# Patient Record
Sex: FEMALE | Race: WHITE | Employment: FULL TIME | URBAN - METROPOLITAN AREA
[De-identification: names, ages, dates, MRNs, and addresses within clinical notes are randomized per-mention and may not be internally consistent; named-entity substitution may affect disease eponyms.]

---

## 2021-03-26 ENCOUNTER — HOSPITAL ENCOUNTER (EMERGENCY)
Age: 26
Discharge: HOME | End: 2021-03-26
Attending: EMERGENCY MEDICINE
Payer: MEDICARE

## 2021-03-26 ENCOUNTER — APPOINTMENT (OUTPATIENT)
Dept: GENERAL RADIOLOGY | Age: 26
End: 2021-03-26
Payer: MEDICARE

## 2021-03-26 VITALS
WEIGHT: 180 LBS | TEMPERATURE: 98.8 F | RESPIRATION RATE: 16 BRPM | OXYGEN SATURATION: 97 % | HEIGHT: 66 IN | BODY MASS INDEX: 28.93 KG/M2 | DIASTOLIC BLOOD PRESSURE: 70 MMHG | SYSTOLIC BLOOD PRESSURE: 133 MMHG | HEART RATE: 94 BPM

## 2021-03-26 DIAGNOSIS — R07.9 CHEST PAIN, UNSPECIFIED TYPE: Primary | ICD-10-CM

## 2021-03-26 DIAGNOSIS — R06.00 DYSPNEA, UNSPECIFIED TYPE: ICD-10-CM

## 2021-03-26 LAB
ABSOLUTE EOS #: 0.08 K/UL (ref 0–0.44)
ABSOLUTE IMMATURE GRANULOCYTE: 0.02 K/UL (ref 0–0.3)
ABSOLUTE LYMPH #: 1.97 K/UL (ref 1.1–3.7)
ABSOLUTE MONO #: 0.36 K/UL (ref 0.1–1.2)
ANION GAP SERPL CALCULATED.3IONS-SCNC: 13 MMOL/L (ref 9–17)
BASOPHILS # BLD: 0 % (ref 0–2)
BASOPHILS ABSOLUTE: <0.03 K/UL (ref 0–0.2)
BUN BLDV-MCNC: 10 MG/DL (ref 6–20)
BUN/CREAT BLD: 14 (ref 9–20)
CALCIUM SERPL-MCNC: 9.5 MG/DL (ref 8.6–10.4)
CHLORIDE BLD-SCNC: 104 MMOL/L (ref 98–107)
CO2: 23 MMOL/L (ref 20–31)
CREAT SERPL-MCNC: 0.7 MG/DL (ref 0.5–0.9)
D-DIMER QUANTITATIVE: <0.27 MG/L FEU (ref 0–0.59)
DIFFERENTIAL TYPE: ABNORMAL
EOSINOPHILS RELATIVE PERCENT: 1 % (ref 1–4)
GFR AFRICAN AMERICAN: >60 ML/MIN
GFR NON-AFRICAN AMERICAN: >60 ML/MIN
GFR SERPL CREATININE-BSD FRML MDRD: ABNORMAL ML/MIN/{1.73_M2}
GFR SERPL CREATININE-BSD FRML MDRD: ABNORMAL ML/MIN/{1.73_M2}
GLUCOSE BLD-MCNC: 103 MG/DL (ref 70–99)
HCT VFR BLD CALC: 47.1 % (ref 36.3–47.1)
HEMOGLOBIN: 15.6 G/DL (ref 11.9–15.1)
IMMATURE GRANULOCYTES: 0 %
LYMPHOCYTES # BLD: 33 % (ref 24–43)
MCH RBC QN AUTO: 29.8 PG (ref 25.2–33.5)
MCHC RBC AUTO-ENTMCNC: 33.1 G/DL (ref 28.4–34.8)
MCV RBC AUTO: 90.1 FL (ref 82.6–102.9)
MONOCYTES # BLD: 6 % (ref 3–12)
MYOGLOBIN: 29 NG/ML (ref 25–58)
NRBC AUTOMATED: 0 PER 100 WBC
PDW BLD-RTO: 11.9 % (ref 11.8–14.4)
PLATELET # BLD: 319 K/UL (ref 138–453)
PLATELET ESTIMATE: ABNORMAL
PMV BLD AUTO: 9.6 FL (ref 8.1–13.5)
POTASSIUM SERPL-SCNC: 4.1 MMOL/L (ref 3.7–5.3)
RBC # BLD: 5.23 M/UL (ref 3.95–5.11)
RBC # BLD: ABNORMAL 10*6/UL
SEG NEUTROPHILS: 60 % (ref 36–65)
SEGMENTED NEUTROPHILS ABSOLUTE COUNT: 3.53 K/UL (ref 1.5–8.1)
SODIUM BLD-SCNC: 140 MMOL/L (ref 135–144)
TROPONIN INTERP: NORMAL
TROPONIN T: NORMAL NG/ML
TROPONIN, HIGH SENSITIVITY: <6 NG/L (ref 0–14)
WBC # BLD: 6 K/UL (ref 3.5–11.3)
WBC # BLD: ABNORMAL 10*3/UL

## 2021-03-26 PROCEDURE — 83874 ASSAY OF MYOGLOBIN: CPT

## 2021-03-26 PROCEDURE — 85379 FIBRIN DEGRADATION QUANT: CPT

## 2021-03-26 PROCEDURE — 85025 COMPLETE CBC W/AUTO DIFF WBC: CPT

## 2021-03-26 PROCEDURE — 99283 EMERGENCY DEPT VISIT LOW MDM: CPT

## 2021-03-26 PROCEDURE — 84484 ASSAY OF TROPONIN QUANT: CPT

## 2021-03-26 PROCEDURE — 80048 BASIC METABOLIC PNL TOTAL CA: CPT

## 2021-03-26 PROCEDURE — 71045 X-RAY EXAM CHEST 1 VIEW: CPT

## 2021-03-26 PROCEDURE — 93005 ELECTROCARDIOGRAM TRACING: CPT | Performed by: NURSE PRACTITIONER

## 2021-03-26 RX ORDER — LORATADINE 10 MG/1
10 TABLET ORAL DAILY
COMMUNITY
End: 2022-01-19

## 2021-03-26 RX ORDER — PREDNISONE 20 MG/1
20 TABLET ORAL DAILY
COMMUNITY
End: 2022-01-19

## 2021-03-26 ASSESSMENT — ENCOUNTER SYMPTOMS
SORE THROAT: 0
SINUS PRESSURE: 0
DIARRHEA: 0
SHORTNESS OF BREATH: 1
NAUSEA: 0
COUGH: 1
COLOR CHANGE: 0
RHINORRHEA: 0
VOMITING: 0
ABDOMINAL PAIN: 0

## 2021-03-26 NOTE — ED PROVIDER NOTES
[address] ED  eMERGENCY dEPARTMENT eNCOUnter      Pt Name: Louann Farley  MRN: [id number]  [place] 1995  Date of evaluation: 3/26/2021  Provider: SHAY Saldaña CNP    CHIEF COMPLAINT       Chief Complaint   Patient presents with    Shortness of Breath    Chest Pain    Cough         HISTORY OF PRESENT ILLNESS  (Location/Symptom, Timing/Onset, Context/Setting, Quality, Duration, Modifying Factors, Severity.)   Louann Farley is a 23 y.o. female who presents to the emergency department via private auto for cough, SOB, chest discomfort. Onset was about a week ago. States the symptoms are intermittent. She has medications from an urgent care that she has been taking. Denies fever, chills, N/V/D. States there are days in which she feels fine and has no symptoms; she is able to yard work, etc. Denies chance of pregnancy. Denies use of hormonal contraceptives. R Rates her pain 2/10 at this time. Nursing Notes were reviewed. ALLERGIES     Patient has no known allergies. CURRENT MEDICATIONS       Previous Medications    LORATADINE (CLARITIN) 10 MG TABLET    Take 10 mg by mouth daily    PREDNISONE (DELTASONE) 20 MG TABLET    Take 20 mg by mouth daily       PAST MEDICAL HISTORY         Diagnosis Date    ADHD (attention deficit hyperactivity disorder)     Anxiety     Depression        SURGICAL HISTORY     History reviewed. No pertinent surgical history. FAMILY HISTORY     History reviewed. No pertinent family history. Family Status   Relation Name Status    Mother  Alive    Father  Alive        SOCIAL HISTORY      reports that she has never smoked. She has never used smokeless tobacco. She reports that she does not drink alcohol or use drugs. REVIEW OF SYSTEMS    (2-9 systems for level 4, 10 or more for level 5)     Review of Systems   Constitutional: Positive for fatigue. Negative for chills, diaphoresis and fever.    HENT: Negative for congestion, ear discharge, ear pain, postnasal drip, rhinorrhea, sinus pressure and sore throat. Respiratory: Positive for cough and shortness of breath. Cardiovascular: Positive for chest pain. Negative for palpitations. Gastrointestinal: Negative for abdominal pain, diarrhea, nausea and vomiting. Musculoskeletal: Positive for arthralgias and myalgias. Negative for neck pain and neck stiffness. Skin: Negative for color change and rash. Neurological: Negative for dizziness, weakness, light-headedness and headaches. *   Except as noted above the remainder of the review of systems was reviewed and negative. PHYSICAL EXAM    (up to 7 for level 4, 8 or more for level 5)     ED Triage Vitals [03/26/21 1232]   BP Temp Temp Source Pulse Resp SpO2 Height Weight   133/70 98.8 °F (37.1 °C) Oral 94 16 97 % 5' 6\" (1.676 m) 180 lb (81.6 kg)     Physical Exam  Vitals signs reviewed. Constitutional:       General: She is not in acute distress. Appearance: She is well-developed. She is not diaphoretic. Eyes:      General: No scleral icterus. Conjunctiva/sclera: Conjunctivae normal.   Cardiovascular:      Rate and Rhythm: Normal rate and regular rhythm. Heart sounds: Normal heart sounds. Pulmonary:      Effort: Pulmonary effort is normal. No respiratory distress. Breath sounds: Normal breath sounds. No stridor. No wheezing or rales. Musculoskeletal:      Comments: Moves extremities   Skin:     General: Skin is warm and dry. Findings: No rash. Neurological:      Mental Status: She is alert and oriented to person, place, and time. Psychiatric:         Behavior: Behavior normal.           DIAGNOSTIC RESULTS     EKG: All EKG's are interpreted by the Emergency Department Physician who either signs or Co-signs this chart in the absence of a cardiologist.  EKG was interpreted by  [facility] after completion.       RADIOLOGY:   Non-plain film images such as CT, Ultrasound and MRI are read by the radiologist. Benjamin Ward radiographic images are visualized and preliminarily interpreted by the emergency physician with the below findings:    Interpretation per the Radiologist below, if available at the time of this note:    Xr Chest Portable    Result Date: 3/26/2021  EXAMINATION: [address] 3/26/2021 1:02 pm COMPARISON: None. HISTORY: ORDERING SYSTEM PROVIDED HISTORY: Chest Pain TECHNOLOGIST PROVIDED HISTORY: Chest Pain Reason for Exam: anxiety port ap upright Acuity: Unknown Type of Exam: Unknown FINDINGS: AP portable view of the chest is submitted, time stamped at 1258 hours. Heart size is normal.  No vascular congestion, focal consolidation, effusion, or pneumothorax is noted. Osseous and mediastinal structures are age-appropriate. No acute cardiopulmonary process. LABS:  Labs Reviewed   BASIC METABOLIC PANEL - Abnormal; Notable for the following components:       Result Value    Glucose 103 (*)     All other components within normal limits   CBC WITH AUTO DIFFERENTIAL - Abnormal; Notable for the following components:    RBC 5.23 (*)     Hemoglobin 15.6 (*)     All other components within normal limits   TROP/MYOGLOBIN   D-DIMER, QUANTITATIVE       All other labs were within normal range or not returned as of this dictation. EMERGENCY DEPARTMENT COURSE and DIFFERENTIAL DIAGNOSIS/MDM:   Vitals:    Vitals:    03/26/21 1232   BP: 133/70   Pulse: 94   Resp: 16   Temp: 98.8 °F (37.1 °C)   TempSrc: Oral   SpO2: 97%   Weight: 180 lb (81.6 kg)   Height: 5' 6\" (1.676 m)       CLINICAL DECISION MAKING:  The patient presented alert with a nontoxic appearance and was seen in conjunction with Dr. Allie Tang. Laboratory studies were unremarkable, including a d-dimer. Imaging was negative for acute findings. She was advised to take the prescriptions from the urgent care. Follow up with a pcp for further evaluation and treatment, return to ED if condition worsens. FINAL IMPRESSION      1.  Chest pain, unspecified type    2.  Dyspnea, unspecified type            DISPOSITION/PLAN   DISPOSITION Decision To Discharge 03/26/2021 01:43:25 PM      PATIENT REFERRED TO:   Call Duane Colas to establish care for follow up    Schedule an appointment as soon as possible for a visit       [facility] ED  [address] [facility]  [phone]    If symptoms worsen, As needed      DISCHARGE MEDICATIONS:     New Prescriptions    No medications on file           (Please note that portions of this note were completed with a voice recognition program.  Efforts were made to edit the dictations but occasionally words are mis-transcribed.)    SHAY Ford CNP, APRN - CNP  03/26/21 6352

## 2021-03-26 NOTE — LETTER
[facility] ED  [address] [place] [address]  Phone: [phone]             March 26, 2021    Patient: Paulette Turcios   YOB: 1995   Date of Visit: 3/26/2021       To Whom It May Concern:    Paulette Turcios was seen and treated in our emergency department on 3/26/2021. Please excuse her from work today.     Sincerely,             Signature:__________________________________

## 2021-03-26 NOTE — ED PROVIDER NOTES
EMERGENCY DEPARTMENT ENCOUNTER   ATTENDING ATTESTATION     Pt Name: Dar Reed  MRN: [id number]  [place] 1995  Date of evaluation: 3/26/21   Dar Reed is a 23 y.o. female with CC: Shortness of Breath, Chest Pain, and Cough    MDM:   The patient is is a 27-year-old female who presented to the emergency department secondary to chest pain shortness of breath. EKG normal sinus rhythm no acute ST elevations or findings consistent with STEMI. Chest x-ray no infiltrate dissection pneumothorax. Laboratory analysis unremarkable. Patient 700% on room air no acute respiratory distress. Discharged home with outpatient follow-up and given parameters to return to the emergency department. CRITICAL CARE:       EKG: All EKG's are interpreted by the Emergency Department Physician who either signs or Co-signs this chart in the absence of a cardiologist.      RADIOLOGY:All plain film, CT, MRI, and formal ultrasound images (except ED bedside ultrasound) are read by the radiologist, see reports below, unless otherwise noted in MDM or here. XR CHEST PORTABLE    (Results Pending)     LABS: All lab results were reviewed by myself, and all abnormals are listed below. Labs Reviewed   BASIC METABOLIC PANEL   CBC WITH AUTO DIFFERENTIAL   TROP/MYOGLOBIN   D-DIMER, QUANTITATIVE     CONSULTS:  None  FINAL IMPRESSION    No diagnosis found. PASTMEDICAL HISTORY     Past Medical History:   Diagnosis Date    ADHD (attention deficit hyperactivity disorder)     Anxiety     Depression      SURGICAL HISTORY     History reviewed. No pertinent surgical history. CURRENT MEDICATIONS       Previous Medications    LORATADINE (CLARITIN) 10 MG TABLET    Take 10 mg by mouth daily    PREDNISONE (DELTASONE) 20 MG TABLET    Take 20 mg by mouth daily     ALLERGIES     has No Known Allergies. FAMILY HISTORY     She indicated that her mother is alive. She indicated that her father is alive.      SOCIAL HISTORY       Social History     Tobacco Use    Smoking status: Never Smoker    Smokeless tobacco: Never Used   Substance Use Topics    Alcohol use: No     Alcohol/week: 0.0 standard drinks    Drug use: Never       I personally evaluated and examined the patient in conjunction with the APC and agree with the assessment, treatment plan, and disposition of the patient as recorded by the APC.    Suzanne Vera MD  Attending Emergency Physician          Suzanne Vera MD  31/88/18 93 Brian Christine MD  31/43/93 [id number]

## 2021-03-27 LAB
EKG ATRIAL RATE: 75 BPM
EKG P AXIS: 47 DEGREES
EKG P-R INTERVAL: 138 MS
EKG Q-T INTERVAL: 360 MS
EKG QRS DURATION: 78 MS
EKG QTC CALCULATION (BAZETT): 402 MS
EKG R AXIS: 63 DEGREES
EKG T AXIS: 43 DEGREES
EKG VENTRICULAR RATE: 75 BPM

## 2021-03-27 PROCEDURE — 93010 ELECTROCARDIOGRAM REPORT: CPT | Performed by: INTERNAL MEDICINE

## 2022-01-19 ENCOUNTER — OFFICE VISIT (OUTPATIENT)
Dept: PRIMARY CARE CLINIC | Age: 27
End: 2022-01-19
Payer: MEDICARE

## 2022-01-19 VITALS
SYSTOLIC BLOOD PRESSURE: 110 MMHG | BODY MASS INDEX: 28.45 KG/M2 | OXYGEN SATURATION: 98 % | HEART RATE: 95 BPM | DIASTOLIC BLOOD PRESSURE: 68 MMHG | WEIGHT: 177 LBS | HEIGHT: 66 IN

## 2022-01-19 DIAGNOSIS — F32.A DEPRESSION, UNSPECIFIED DEPRESSION TYPE: ICD-10-CM

## 2022-01-19 DIAGNOSIS — K58.9 IRRITABLE BOWEL SYNDROME WITHOUT DIARRHEA: ICD-10-CM

## 2022-01-19 DIAGNOSIS — F90.2 ATTENTION DEFICIT HYPERACTIVITY DISORDER (ADHD), COMBINED TYPE: Primary | ICD-10-CM

## 2022-01-19 PROCEDURE — G8484 FLU IMMUNIZE NO ADMIN: HCPCS | Performed by: PHYSICIAN ASSISTANT

## 2022-01-19 PROCEDURE — G8427 DOCREV CUR MEDS BY ELIG CLIN: HCPCS | Performed by: PHYSICIAN ASSISTANT

## 2022-01-19 PROCEDURE — 99203 OFFICE O/P NEW LOW 30 MIN: CPT | Performed by: PHYSICIAN ASSISTANT

## 2022-01-19 PROCEDURE — G8419 CALC BMI OUT NRM PARAM NOF/U: HCPCS | Performed by: PHYSICIAN ASSISTANT

## 2022-01-19 PROCEDURE — 1036F TOBACCO NON-USER: CPT | Performed by: PHYSICIAN ASSISTANT

## 2022-01-19 RX ORDER — DICYCLOMINE HYDROCHLORIDE 10 MG/1
10 CAPSULE ORAL 4 TIMES DAILY
Qty: 30 CAPSULE | Refills: 0 | Status: SHIPPED | OUTPATIENT
Start: 2022-01-19

## 2022-01-19 SDOH — ECONOMIC STABILITY: FOOD INSECURITY: WITHIN THE PAST 12 MONTHS, THE FOOD YOU BOUGHT JUST DIDN'T LAST AND YOU DIDN'T HAVE MONEY TO GET MORE.: NEVER TRUE

## 2022-01-19 SDOH — ECONOMIC STABILITY: FOOD INSECURITY: WITHIN THE PAST 12 MONTHS, YOU WORRIED THAT YOUR FOOD WOULD RUN OUT BEFORE YOU GOT MONEY TO BUY MORE.: NEVER TRUE

## 2022-01-19 ASSESSMENT — ENCOUNTER SYMPTOMS
EYE DISCHARGE: 0
COUGH: 0
SHORTNESS OF BREATH: 1
CONSTIPATION: 0
ABDOMINAL PAIN: 0
RHINORRHEA: 0
VOMITING: 0
ABDOMINAL DISTENTION: 0
SORE THROAT: 0
SINUS PRESSURE: 0
CHEST TIGHTNESS: 0
PHOTOPHOBIA: 0
DIARRHEA: 0

## 2022-01-19 ASSESSMENT — PATIENT HEALTH QUESTIONNAIRE - PHQ9
DEPRESSION UNABLE TO ASSESS: PT REFUSES
SUM OF ALL RESPONSES TO PHQ QUESTIONS 1-9: 0
1. LITTLE INTEREST OR PLEASURE IN DOING THINGS: 0
SUM OF ALL RESPONSES TO PHQ QUESTIONS 1-9: 0
SUM OF ALL RESPONSES TO PHQ9 QUESTIONS 1 & 2: 0
2. FEELING DOWN, DEPRESSED OR HOPELESS: 0

## 2022-01-19 ASSESSMENT — SOCIAL DETERMINANTS OF HEALTH (SDOH): HOW HARD IS IT FOR YOU TO PAY FOR THE VERY BASICS LIKE FOOD, HOUSING, MEDICAL CARE, AND HEATING?: NOT HARD AT ALL

## 2022-01-19 NOTE — PROGRESS NOTES
[facility] PRIMARY [facility]  [address] [address]  [address] [address]  Dept: [phone]    Jacob Roach is a 23 y.o. female New patient, who presents today for her medical conditions/complaints as noted below. Chief Complaint   Patient presents with    New Patient       HPI:     HPI: The patient has not been to a doctor. Has ADHD and mood disorder. Feels that she has had covid 3 times. Beginning of January also had it . Has noticed some breathing issues since then has been dizzier. O2 monitor was normal. Not in any counseling. Has issues with food issues with anaphylasis. Scared of peanuts and some spices. Does not want. Reviewed prior notes None  Reviewed previous Labs    No results found for: LDLCHOLESTEROL, LDLCALC    (goal LDL is <100)   BUN (mg/dL)   Date Value   03/26/2021 10     BP Readings from Last 3 Encounters:   01/19/22 110/68   03/26/21 133/70   05/21/16 105/66          (goal 120/80)    Past Medical History:   Diagnosis Date    ADHD (attention deficit hyperactivity disorder)     Anxiety     Depression       No past surgical history on file. No family history on file. Social History     Tobacco Use    Smoking status: Never Smoker    Smokeless tobacco: Never Used   Substance Use Topics    Alcohol use: No     Alcohol/week: 0.0 standard drinks      Current Outpatient Medications   Medication Sig Dispense Refill    dicyclomine (BENTYL) 10 MG capsule Take 1 capsule by mouth 4 times daily 30 capsule 0     No current facility-administered medications for this visit.      Allergies   Allergen Reactions    Cucumber Extract Anaphylaxis    Pollen Extract        Health Maintenance   Topic Date Due    Hepatitis C screen  Never done    Varicella vaccine (1 of 2 - 2-dose childhood series) Never done    HPV vaccine (1 - 2-dose series) Never done    Depression Screen  Never done    Hepatitis B vaccine (2 of 3 - 3-dose primary series) 05/06/2008  HIV screen  Never done    DTaP/Tdap/Td vaccine (1 - Tdap) Never done    Pap smear  Never done    Flu vaccine (1) Never done    COVID-19 Vaccine (3 - Booster for Pfizer series) 12/02/2021    Hepatitis A vaccine  Aged Out    Hib vaccine  Aged Out    Meningococcal (ACWY) vaccine  Aged Out    Pneumococcal 0-64 years Vaccine  Aged Out       Subjective:      Review of Systems   Constitutional: Negative for chills, fever and unexpected weight change. HENT: Negative for congestion, hearing loss, rhinorrhea, sinus pressure and sore throat. Eyes: Negative for photophobia, discharge and visual disturbance. Respiratory: Positive for shortness of breath. Negative for cough and chest tightness. Cardiovascular: Negative for chest pain, palpitations and leg swelling. Gastrointestinal: Negative for abdominal distention, abdominal pain, constipation, diarrhea and vomiting. Endocrine: Negative for polydipsia and polyuria. Genitourinary: Negative for decreased urine volume, difficulty urinating, frequency and urgency. Musculoskeletal: Negative for arthralgias, gait problem and myalgias. Skin: Negative for rash. Allergic/Immunologic: Negative for food allergies. Neurological: Negative for dizziness, weakness, numbness and headaches. Hematological: Negative for adenopathy. Psychiatric/Behavioral: Positive for decreased concentration and dysphoric mood. Negative for sleep disturbance. The patient is nervous/anxious. Objective:     /68   Pulse 95   Ht 5' 6\" (1.676 m)   Wt 177 lb (80.3 kg)   SpO2 98%   BMI 28.57 kg/m²   Physical Exam  Constitutional:       General: She is not in acute distress. Appearance: Normal appearance. She is not ill-appearing. HENT:      Head: Normocephalic and atraumatic.       Right Ear: External ear normal.      Left Ear: External ear normal.      Nose: Nose normal.      Mouth/Throat:      Mouth: Mucous membranes are moist.   Eyes:      Extraocular Movements: Extraocular movements intact. Conjunctiva/sclera: Conjunctivae normal.      Pupils: Pupils are equal, round, and reactive to light. Neck:      Vascular: No carotid bruit. Cardiovascular:      Rate and Rhythm: Normal rate and regular rhythm. Pulses: Normal pulses. Heart sounds: Normal heart sounds. Pulmonary:      Effort: Pulmonary effort is normal. No respiratory distress. Breath sounds: Normal breath sounds. Abdominal:      General: Bowel sounds are normal. There is no distension. Tenderness: There is no abdominal tenderness. Musculoskeletal:         General: Normal range of motion. Cervical back: Normal range of motion and neck supple. Lymphadenopathy:      Cervical: No cervical adenopathy. Skin:     General: Skin is warm and dry. Neurological:      General: No focal deficit present. Mental Status: She is alert and oriented to person, place, and time. Psychiatric:         Mood and Affect: Mood normal.         Behavior: Behavior normal.         Thought Content: Thought content normal.         Assessment and Plan:          1. Attention deficit hyperactivity disorder (ADHD), combined type  -     [facility] ([address] [place] [address])  2. Depression, unspecified depression type  -     [facility] ([address] [place] [address])  3. Irritable bowel syndrome without diarrhea  -     CBC Auto Differential; Future  -     Comprehensive Metabolic Panel; Future  -     Lipid, Fasting; Future  -     dicyclomine (BENTYL) 10 MG capsule; Take 1 capsule by mouth 4 times daily, Disp-30 capsule, R-0Normal  -     TSH With Reflex Ft4; Future             Patient given educational materials - see patient instructions. Discussed use, benefit, and side effects of prescribed medications. All patient questions answered. Pt voiced understanding. Reviewed health maintenance. Instructed to continue current medications, diet and exercise.   Patient agreed with treatment plan. Follow up as directed.      Electronically signed by GRETCHEN Morris on 1/19/2022 at 1:28 PM

## 2022-02-14 ENCOUNTER — TELEPHONE (OUTPATIENT)
Dept: PRIMARY CARE CLINIC | Age: 27
End: 2022-02-14

## 2022-02-14 ENCOUNTER — HOSPITAL ENCOUNTER (OUTPATIENT)
Age: 27
Discharge: HOME | End: 2022-02-14
Payer: MEDICARE

## 2022-02-14 DIAGNOSIS — K58.9 IRRITABLE BOWEL SYNDROME WITHOUT DIARRHEA: ICD-10-CM

## 2022-02-14 LAB
ABSOLUTE EOS #: 0.09 K/UL (ref 0–0.44)
ABSOLUTE IMMATURE GRANULOCYTE: <0.03 K/UL (ref 0–0.3)
ABSOLUTE LYMPH #: 2.03 K/UL (ref 1.1–3.7)
ABSOLUTE MONO #: 0.4 K/UL (ref 0.1–1.2)
ALBUMIN SERPL-MCNC: 4.6 G/DL (ref 3.5–5.2)
ALBUMIN/GLOBULIN RATIO: 1.7 (ref 1–2.5)
ALP BLD-CCNC: 67 U/L (ref 35–104)
ALT SERPL-CCNC: 15 U/L (ref 5–33)
ANION GAP SERPL CALCULATED.3IONS-SCNC: 14 MMOL/L (ref 9–17)
AST SERPL-CCNC: 15 U/L
BASOPHILS # BLD: 0 % (ref 0–2)
BASOPHILS ABSOLUTE: <0.03 K/UL (ref 0–0.2)
BILIRUB SERPL-MCNC: 1.39 MG/DL (ref 0.3–1.2)
BUN BLDV-MCNC: 8 MG/DL (ref 6–20)
BUN/CREAT BLD: ABNORMAL (ref 9–20)
CALCIUM SERPL-MCNC: 9.4 MG/DL (ref 8.6–10.4)
CHLORIDE BLD-SCNC: 101 MMOL/L (ref 98–107)
CHOLESTEROL, FASTING: 183 MG/DL
CHOLESTEROL/HDL RATIO: 3.3
CO2: 22 MMOL/L (ref 20–31)
CREAT SERPL-MCNC: 0.64 MG/DL (ref 0.5–0.9)
DIFFERENTIAL TYPE: ABNORMAL
EOSINOPHILS RELATIVE PERCENT: 2 % (ref 1–4)
GFR AFRICAN AMERICAN: >60 ML/MIN
GFR NON-AFRICAN AMERICAN: >60 ML/MIN
GFR SERPL CREATININE-BSD FRML MDRD: ABNORMAL ML/MIN/{1.73_M2}
GFR SERPL CREATININE-BSD FRML MDRD: ABNORMAL ML/MIN/{1.73_M2}
GLUCOSE BLD-MCNC: 85 MG/DL (ref 70–99)
HCT VFR BLD CALC: 48.3 % (ref 36.3–47.1)
HDLC SERPL-MCNC: 55 MG/DL
HEMOGLOBIN: 15.6 G/DL (ref 11.9–15.1)
IMMATURE GRANULOCYTES: 0 %
LDL CHOLESTEROL: 110 MG/DL (ref 0–130)
LYMPHOCYTES # BLD: 38 % (ref 24–43)
MCH RBC QN AUTO: 29.4 PG (ref 25.2–33.5)
MCHC RBC AUTO-ENTMCNC: 32.3 G/DL (ref 28.4–34.8)
MCV RBC AUTO: 91.1 FL (ref 82.6–102.9)
MONOCYTES # BLD: 8 % (ref 3–12)
NRBC AUTOMATED: 0 PER 100 WBC
PDW BLD-RTO: 11.9 % (ref 11.8–14.4)
PLATELET # BLD: 347 K/UL (ref 138–453)
PLATELET ESTIMATE: ABNORMAL
PMV BLD AUTO: 10.2 FL (ref 8.1–13.5)
POTASSIUM SERPL-SCNC: 4.3 MMOL/L (ref 3.7–5.3)
RBC # BLD: 5.3 M/UL (ref 3.95–5.11)
RBC # BLD: ABNORMAL 10*6/UL
SEG NEUTROPHILS: 52 % (ref 36–65)
SEGMENTED NEUTROPHILS ABSOLUTE COUNT: 2.78 K/UL (ref 1.5–8.1)
SODIUM BLD-SCNC: 137 MMOL/L (ref 135–144)
TOTAL PROTEIN: 7.3 G/DL (ref 6.4–8.3)
TRIGLYCERIDE, FASTING: 89 MG/DL
TSH SERPL DL<=0.05 MIU/L-ACNC: 1.32 MIU/L (ref 0.3–5)
VLDLC SERPL CALC-MCNC: NORMAL MG/DL (ref 1–30)
WBC # BLD: 5.3 K/UL (ref 3.5–11.3)
WBC # BLD: ABNORMAL 10*3/UL

## 2022-02-14 PROCEDURE — 80053 COMPREHEN METABOLIC PANEL: CPT

## 2022-02-14 PROCEDURE — 36415 COLL VENOUS BLD VENIPUNCTURE: CPT

## 2022-02-14 PROCEDURE — 80061 LIPID PANEL: CPT

## 2022-02-14 PROCEDURE — 85025 COMPLETE CBC W/AUTO DIFF WBC: CPT

## 2022-02-14 PROCEDURE — 84443 ASSAY THYROID STIM HORMONE: CPT

## 2022-02-14 NOTE — TELEPHONE ENCOUNTER
You ordered BW on pt and she wants to get it done. C/o over the last week, feeling tired, weak and dizziness. Asking, if you would want to add any more BW due to these symptoms? Pt has a phobia re getting BW done and once to get everything all at once. Please advise.

## 2022-02-16 ENCOUNTER — TELEPHONE (OUTPATIENT)
Dept: PRIMARY CARE CLINIC | Age: 27
End: 2022-02-16

## 2022-02-16 NOTE — TELEPHONE ENCOUNTER
Patient sent a my chart message after lab work came back normal asking what the next steps were for her dizziness and was told PT could be ordered to call if she wanted an order for that. She is confused as to what PT would do for dizziness and is not sure this is the right direction to go. Please advise.

## 2022-02-17 DIAGNOSIS — R42 DIZZINESS: Primary | ICD-10-CM

## 2022-02-17 NOTE — TELEPHONE ENCOUNTER
Some therapists do special things to help with dizziness, so yes, it will help, just need to make sure she is seeing someone that does \"dizzy\" therapy

## 2022-02-17 NOTE — TELEPHONE ENCOUNTER
Pt asking if P.T. going to know what is causing her dizziness, or is it going to be a waste of her time? Should she have testing done?

## 2022-02-21 DIAGNOSIS — R42 DIZZINESS: Primary | ICD-10-CM

## 2022-02-22 ENCOUNTER — TELEMEDICINE (OUTPATIENT)
Age: 27
End: 2022-02-22
Payer: MEDICARE

## 2022-02-22 DIAGNOSIS — F33.1 MAJOR DEPRESSIVE DISORDER, RECURRENT EPISODE, MODERATE WITH ANXIOUS DISTRESS (HCC): Primary | ICD-10-CM

## 2022-02-22 PROCEDURE — 1036F TOBACCO NON-USER: CPT | Performed by: PSYCHOLOGIST

## 2022-02-22 PROCEDURE — 90791 PSYCH DIAGNOSTIC EVALUATION: CPT | Performed by: PSYCHOLOGIST

## 2022-02-22 NOTE — PROGRESS NOTES
Ines Hoff M.A. Psychology Doctoral Trainee    Supervising Clinical Psychologists:  Bhavna Chung, Ph.D. Param Tapia,  Ph.D. Francia Flores [address]48      Visit Date: 2/22/2022   Time of appointment:  2:00-3:00 pm   Time spent with Patient: 60 minutes. This is patient's first appointment. Reason for Consult:  New Patient, Depression, and Anxiety     Referring Provider/PCP:    GRETCHEN Mccoy, [address]      Pt was seen for a virtual visit (using audio & video) due to the COVID-19 pandemic via [employer]. She provided informed consent for the behavioral health program and for using telehealth. Pt was alone in her home during the visit. Clinician location: [facility]'s office in [place], [place] . Discussed with patient model of service to include the limits of confidentiality (i.e. abuse reporting, suicide intervention, etc.) and short-term intervention focused approach. Discussed the risks to using telehealth (I.e., service disruptions). Also discussed with patient that the service provider is a supervised clinician and is being supervised by Dr. Clementina Hampton or Dr. Kendra Pedro. Pt indicated understanding. PRESENTING PROBLEM AND HISTORY  Lamar Apley is a 33 y.o. adult who identifies as nonbinary and who presents for new evaluation and treatment of anxiety, depression. Cameron Fierro has the following symptoms: depressed mood, excessive guilt, recurrent thoughts of death, mood swings, increase in goal directed activity, excessive anxiety and worry about specific stressors, excessive worry about a number of events or activities , inability to stop or control worry, avoidance of situations that provoke fear and anxiety, feeling afraid something awful might happen, feeling numb or detached and relationship issues.  Pt reported several stressors over the past 4 years that have led to symptoms of anxiety (primarily health related) and goal directed activity (excessive cleaning). Current stressors include relationship issues primarily originating from partner's side of the family, finances, and health. She reported she helps take care of her partner who suffers from undiagnosed health issues. Onset of symptoms was approximately several years ago. Symptoms have been gradually worsening since that time. Ruddy Godinez denies current suicidal and homicidal ideation. Family history significant for not assessed. Risk factors: previous episode of depression, relationship issues. Previous treatment includes Effexor, Lexapro, Prozac, Seroquel, Adderall, and Abilify. Ruddy Godinez complains of the following medication side effects: weight gain, fatigue/droziness, and worsened symptoms of depression. MENTAL STATUS EXAM  Mood was anxious and sad with congruent and tearful affect. Suicidal ideation was denied. Homicidal ideation was denied. Hygiene was good . Dress was appropriate. Behavior was Within Normal Limits with No observation or self-report of difficulties ambulating. Attitude was Cooperative. Eye-contact was good. Speech: rate - WNL, rhythm -  WNL, volume - WNL  Verbalizations were goal directed and coherent. Thought processes were intact and goal-oriented without evidence of delusions, hallucinations, obsessions, or abby; with no cognitive distortions. Associations were characterized by intact cognitive processes. Pt was oriented to person, place, time, and general circumstances;  recent:  good and remote:  good. Insight and judgment were estimated to be good, AEB, a good  understanding of cyclical maladaptive patterns, and the ability to use insight to inform behavior change. CURRENT MEDICATIONS    Current Outpatient Medications:     dicyclomine (BENTYL) 10 MG capsule, Take 1 capsule by mouth 4 times daily, Disp: 30 capsule, Rfl: 0     FAMILY MEDICAL/MH HISTORY   Joseph Marte's family history is not on file.     [facility] HISTORY  Neto Fam reported a mental health significant for ADHD (diagnosed at age 26), depression, and anxiety. She stated she has taken several medications in the past for mood-related symptoms but stopped taking them due to adverse side effects. She reports a history of psychiatric hospitalizations and a suicide attempt. She was first hospitalized at age 26 year for a suicide attempt. She stated she took approximately 5 Lexapro pills, told someone, and was taken to the hospital. The second hospitalization was a voluntary admission at age 25 after the breakup of a relationship. She stated she \"felt so down\" and, although she did not want to kill herself, was having intrusive suicidal ideation. She is not currently on any psychotropic medications but is open to considering medication as a treatment option. PSYCHOSOCIAL HISTORY  Current living situation: She currently lives in a house with her partner who she is engaged to. Work/Education: She completed some college. She has worked restaurant and retail positions. She left work in [employer] in December because it was \"too stressful. \" She has worked temporary positions recently for the retailer, [employer]. She is currently unemployed. Support system: She describes her parents and her partner as her support system. DRUG AND ALCOHOL CURRENT USE/HISTORY  TOBACCO:  Jorge Hernández reports that Jorge Hernández has never smoked. Jorge Hernández has never used smokeless tobacco.  ALCOHOL:  Jorge Hernández reports no history of alcohol use. OTHER SUBSTANCES: Jorge Hernández reports no history of drug use. ASSESSMENT  Jorge Hernández presented to the appointment today for evaluation and treatment of symptoms of depression and anxiety. Jorge Hernández is currently deemed low risk to [facility] and no risk to others.  She currently meets criteria for major depressive disorder recurrent episode with anxious distress evidenced by the following symptoms: depressed mood, excessive guilt, recurrent thoughts of death, mood swings, increase in goal directed activity, excessive anxiety and worry about specific stressors, excessive worry about a number of events or activities , inability to stop or control worry, avoidance of situations that provoke fear and anxiety, feeling afraid something awful might happen, and feeling numb or detached. Lizzeth Dominguez will benefit from a medication evaluation to assess if psychotropic medications could be helpful in treating her symptoms. Paris's symptoms are not well controlled at this time. Lizzeth Dominguez will also benefit from regular/weekly psychotherapy services with a mental health clinician to address symptoms of depression and anxiety. In the meantime, Master Samaniego will benefit from brief and solution-focused consultation to address cognitive and behavioral interventions for depression and anxiety symptoms while she gets established with a mental health clinician. Master Samaniego was in agreement with recommendations. PHQ Scores 1/19/2022   PHQ2 Score 0   PHQ9 Score 0     Interpretation of Total Score Depression Severity: 1-4 = Minimal depression, 5-9 = Mild depression, 10-14 = Moderate depression, 15-19 = Moderately severe depression, 20-27 = Severe depression    How often pt has had thoughts of death or hurting self (if PHQ positive for depression):       No flowsheet data found. Interpretation of NATALY-7 score: 5-9 = mild anxiety, 10-14 = moderate anxiety, 15+ = severe anxiety. Recommend referral to behavioral health for scores 10 or greater. DIAGNOSIS  Master Samaniego was seen today for new patient, depression and anxiety.     Diagnoses and all orders for this visit:    Major depressive disorder, recurrent episode, moderate with anxious distress ([facility] [address]          INTERVENTION  Provided education on the use of medication to treat  depression and anxiety, Discussed various factors related to the development and maintenance of  depression, anxiety and stress (including biological, cognitive, behavioral, and environmental factors), Discussed potential treatments for  depression and anxiety, Established rapport, [place]-setting to identify pt's primary goals for [facility] visit / overall health, Supportive techniques, Collaborative treatment planning,Clarified role of [facility] in primary care,Recommended that pt establish with a mental health clinician with whom they can meet regularly for psychotherapy services and Reviewed options for identifying appropriate providers      PLAN  1) Follow up in 2 weeks  2) Will provide patient with referral options via [employer] message      Electronically signed by Isamar Sims on 2/22/2022 at 3:59 PM

## 2022-03-24 ENCOUNTER — TELEMEDICINE (OUTPATIENT)
Age: 27
End: 2022-03-24
Payer: MEDICARE

## 2022-03-24 DIAGNOSIS — F33.1 MAJOR DEPRESSIVE DISORDER, RECURRENT EPISODE, MODERATE WITH ANXIOUS DISTRESS (HCC): Primary | ICD-10-CM

## 2022-03-24 PROCEDURE — 1036F TOBACCO NON-USER: CPT | Performed by: PSYCHOLOGIST

## 2022-03-24 PROCEDURE — 90834 PSYTX W PT 45 MINUTES: CPT | Performed by: PSYCHOLOGIST

## 2022-03-24 NOTE — PROGRESS NOTES
Farheen Wilkes M.A. Psychology Doctoral Trainee    Supervising Clinical Psychologists:  Wyman Essex, Ph.D. Vince Casillas,  Ph.D. Eboni Mantillaa      Visit Date: 3/24/2022   Time of appointment:  3:00-3:42 pm   Time spent with Patient: 42 minutes. This is patient's second appointment. Reason for Consult: Follow-up, Depression, and Anxiety     Referring Provider/PCP:    No ref. provider found  GRETCHEN Callahan      Pt was seen for a virtual visit (using audio & video) due to the COVID-19 pandemic via [employer]. She provided informed consent for the behavioral health program and for using telehealth. Pt was alone in her home during the visit. Clinician location: [facility]'s office in [place], [place] . Discussed with patient model of service to include the limits of confidentiality (i.e. abuse reporting, suicide intervention, etc.) and short-term intervention focused approach. Discussed the risks to using telehealth (I.e., service disruptions). Also discussed with patient that the service provider is a supervised clinician and is being supervised by Dr. Molly Palumbo or Dr. Janice Orosco. Pt indicated understanding. Trenton Scott is a 33 y.o. adult who presents for follow up of depression and anxiety. Pt reported she has been \"up and down\" regarding mood/stress but is \"okay\" today. She reported she has called several mental health providers and is on a few waitlists for treatment. Pt is in agreement to see this [facility] until she has established care with another provider. Discussed her goals for therapy. She would like to work on emotion regulation (anxiety and anger) and her confidence. Regarding confidence she stated she feels like a \"disappointment\" because she does not know what she wants to do regarding a career. Pt expressed high expectations from family members have contributed to this thought.  Will discuss treatment options to work toward goals next appointment. Previous Recommendations:   1) Follow up in 2 weeks  2) Will provide patient with referral options via [employer] message      [address] was sad with congruent and tearful affect. Suicidal ideation was denied. Homicidal ideation was denied. Hygiene was good . Dress was appropriate. Behavior was Within Normal Limits with No observation or self-report of difficulties ambulating. Attitude was Friendly and Help-seeking. Eye-contact was good. Speech: rate - WNL, rhythm - WNL, volume - WNL. Verbalizations were goal directed and coherent. Thought processes were intact and goal-oriented without evidence of delusions, hallucinations, obsessions, or abby. Associations were characterized by intact cognitive processes. Pt was oriented to person, place, time, and general circumstances;  recent:  good and remote:  good. Insight and judgment were estimated to be good, AEB, a good  understanding of cyclical maladaptive patterns, and the ability to use insight to inform behavior change. ASSESSMENT  Germania Serrato presented to the appointment today for evaluation and treatment of symptoms of depression and anxiety. Germania Serrato is currently considered to be of no risk to self or others. Pt continues to present with symptoms consistent with a diagnosis of MDD with anxious distress. Pt will likely continue to benefit from treatment focused on depression and anxiety. Pt was in agreement with recommendations. PHQ Scores 1/19/2022   PHQ2 Score 0   PHQ9 Score 0     Interpretation of Total Score Depression Severity: 1-4 = Minimal depression, 5-9 = Mild depression, 10-14 = Moderate depression, 15-19 = Moderately severe depression, 20-27 = Severe depression    How often pt has had thoughts of death or hurting self (if PHQ positive for depression):       No flowsheet data found. Interpretation of NATALY-7 score: 5-9 = mild anxiety, 10-14 = moderate anxiety, 15+ = severe anxiety. Recommend referral to behavioral health for scores 10 or greater. DIAGNOSIS  Neto Fam was seen today for follow-up, depression and anxiety. Diagnoses and all orders for this visit:    Major depressive disorder, recurrent episode, moderate with anxious distress ([address]          INTERVENTION  Discussed various factors related to the development and maintenance of  depression and anxiety (including biological, cognitive, behavioral, and environmental factors), Discussed potential treatments for  depression and anxiety, Discussed self-care (sleep, nutrition, rewarding activities, social support, exercise), [place]-setting to identify pt's primary goals for [facility] / overall health, Supportive techniques, Emphasized self-care as important for managing overall health, Identified maladaptive thoughts and Collaboratively set goals with pt re: therapy    Pt was engaged throughout the visit and responded well to interventions.      PLAN  1) Follow up in 3 weeks  2) Plan to discuss treatment options      Electronically signed by Char Hyatt on 3/24/22 at 4:11 PM EDT

## 2022-04-14 ENCOUNTER — TELEMEDICINE (OUTPATIENT)
Age: 27
End: 2022-04-14
Payer: MEDICARE

## 2022-04-14 DIAGNOSIS — F33.1 MAJOR DEPRESSIVE DISORDER, RECURRENT EPISODE, MODERATE WITH ANXIOUS DISTRESS (HCC): Primary | ICD-10-CM

## 2022-04-14 PROCEDURE — 90834 PSYTX W PT 45 MINUTES: CPT | Performed by: PSYCHOLOGIST

## 2022-04-14 PROCEDURE — 1036F TOBACCO NON-USER: CPT | Performed by: PSYCHOLOGIST
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
## 2022-05-05 ENCOUNTER — TELEMEDICINE (OUTPATIENT)
Age: 27
End: 2022-05-05
Payer: MEDICARE

## 2022-05-05 DIAGNOSIS — F33.1 MAJOR DEPRESSIVE DISORDER, RECURRENT EPISODE, MODERATE WITH ANXIOUS DISTRESS (HCC): Primary | ICD-10-CM

## 2022-05-05 PROCEDURE — 90834 PSYTX W PT 45 MINUTES: CPT | Performed by: PSYCHOLOGIST

## 2022-05-05 PROCEDURE — 1036F TOBACCO NON-USER: CPT | Performed by: PSYCHOLOGIST
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
## 2022-09-06 ENCOUNTER — TELEPHONE (OUTPATIENT)
Dept: PRIMARY CARE CLINIC | Age: 27
End: 2022-09-06

## 2022-09-06 NOTE — TELEPHONE ENCOUNTER
Pt c/o fever of 101.5 today. Went up to 103.2 and back down to 101.5 with Ibuprofen. Pt feels like she has a cold. Going to take a covid test today. Will call back with results and if has any new symptoms/concerns. Advised er, if temp goes over 103 and has any confusion, nausea or rash.

## 2023-04-06 ENCOUNTER — TELEPHONE (OUTPATIENT)
Dept: PRIMARY CARE CLINIC | Age: 28
End: 2023-04-06

## 2023-04-06 NOTE — TELEPHONE ENCOUNTER
Soak it three times a day in warm soapy water and see if we can see her tomorrow.   I can see her at 11:30

## 2023-04-06 NOTE — TELEPHONE ENCOUNTER
Pt called stating she's had a splinter for x2 days in her hand. Pt states she went to urgent care and they referred her to a hand clinic. Pt states she was told to check with PCP first if theres anything we can do. Pt would like to know what she should do. (There were no appts available, was not able to schedule her)    Please advise.

## 2023-04-07 PROBLEM — S60.559A SPLINTER OF HAND: Status: ACTIVE | Noted: 2023-04-07
